# Patient Record
Sex: FEMALE | Race: WHITE | Employment: UNEMPLOYED | URBAN - METROPOLITAN AREA
[De-identification: names, ages, dates, MRNs, and addresses within clinical notes are randomized per-mention and may not be internally consistent; named-entity substitution may affect disease eponyms.]

---
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
## 2024-10-22 NOTE — PROGRESS NOTES
Weekly Treatment Plan Review Phase I Progress Note      ATTENDANCE    Dates: 9/22-9/28 MONDAY TUESDAY WEDNESDAY THURSDAY FRIDAY SATURDAY SUNDAY   Community 0.5 Hours 0.5 Hours 0.5 Hours 0.5 Hours 0.5 Hours       Chem Health                 School 2 Hours 1 Hours 2 Hours 1.5 Hours         Recreation 1 Hours 1 Hours 1 Hours 1 Hours 1 Hours       Specialty Groups* 1 Hours 1 Hours 1 Hours 1 Hours 1 Hours       1:1       0.5 Hours         Health Education                 Family Program                 Family Session   1 Hours             Dual Process Group 1.5 Hours 1.5 Hours 1.5 Hours 1.5 Hours 1.5 Hours       Absent                     *Specialty Groups include Assest Building, Mental Health Education, Spirituality, AA/NA Speakers, Life Skills, Stress Management, Social Skills and DBT Skills Group (Dual-Diagnosis programs only)  ________________________________________________________________________      Weekly Treatment Plan Review    Treatment Plan initiated on: 9/18/2017.    Dimension1: Acute Intoxication/Withdrawal Potential -   Date of Last Use Aug 2017  Any reports of withdrawal symptoms - No      Dimension 2: Biomedical Conditions & Complications -   Medical Concerns:  Client reportedly fell asleep during yoga relaxation on Friday and could not be awakened.  EMS was called and they were able to rouse client.  She was transported to [place] ED where she was medically cleared  Current Medications & Medication Changes: Prozac 40mg, Gabapentin 400mg, Hydroxyzine 10mg, Trazodone 100mg      Dimension 3: Emotional/Behavioral Conditions & Complications -   Mental health diagnosis 296.32 (F33.1) Major Depressive Disorder, Recurrent Episode, Moderate, 300.02 (F41.1) Generalized Anxiety Disorder  312.9 (F991.9) Unspecified Disruptive Impulse Control and Conduct Disorder     Taking meds as prescribed? Yes  Date of last SIB:  Feb 2017  Date of  last SI:  Aug 2017  Date of last HI: None  Behavioral Targets:   "SI/SIB  Current  Assignments:  Mental Health Checklist    Narrative:  Client denies SI or SIB this week.  Her mood has been somewhat labile with her at times appearing positive and laughing with peers and other times more tearful and subdued.  Client has presented with significant irritability some of the days as well.  Client reporting this week that she has her \"depression under control\" and reports anxiety is better so feels treatment is no longer needed.  Dicussed behavioral targets in her family session that would need to be met as well to indicate readiness for discharge.      Dimension 4: Treatment Acceptance / Resistance -   Stage - 1  Commitment to tx process/Stage of change- Contemplation  CAROL assignments - Chemical use self-assessment  Behavior plan -  None  Responsibility contract - None  Peer restrictions - None    Narrative - Client has attended and participated in most groups and activities this week.  As noted earlier, she left early on Friday via EMS for evaluation at the ED.  She verbalizes wanting to be done with treatment and not feeling she needs this any longer.  She has expressed frustration about not advancing in stages.  Staff reviewed what was needed for her to move to stage 2 this week in her family session and again on Wed.  Client struggled with following staff direction on Monday and took food that did not belong to her from the community refrigerator.  Client struggled to take responsibility for this behaviorl      Dimension 5: Relapse / Continued Problem Potential -   Relapses this week - None  Urges to use - None  UA results - Negative    Narrative- Client denies use and denies urges to use.  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    Dimension 6: Recovery Environment -   Family Involvement -   Summarize attendance at family groups and family sessions - Mother and   attended family session on 9/26 - see progress note.  Family supportive of program/stages?  " Yes    Community support group attendance - None - info related to youth meetings as well as [facility] support groups given to mother in family session  Recreational activities - Client reports playing video games and watching TV for recreation.  Program school involvement - Client has attended school program every day except Friday.  She has struggled with engagement in school activities but this has been better this last week.    Narrative - Client and mother participated in a family session on Tuesday.  Mother reported after client went to ED on Friday she then went to [facility] as mother did not feel able to have her come home in part due to her own frustrations with client.  Mother reports that they had some positive discussions upon her return home and she felt encouraged by this.        Discharge Planning:  Target Discharge Date/Timeframe:  8-12 weeks   Med Mgmt Provider/Appt:  Monika Garcia   Ind therapy Provider/Appt:  Lawson Galeana   Family therapy Provider/Appt:  None   Phase II plan:  [facility]  II   School enrollment:  [employer] [facility] in [employer]   Other referrals:  Consider RTC back up       Dimension Scale Review     Prior ratings: Dim1 - 0 DIM2 - 0 DIM3 - 3 DIM4 - 3 DIM5 - 3 DIM6 -3   Current ratings: Dim1 - 0 DIM2 - 0 DIM3 - 3 DIM4 - 3 DIM5 - 3 DIM6 -3       If client is 18 or older, has vulnerable adult status change? N/A    Are Treatment Plan goals/objectives having the intended effect? Yes  *If no, list changes to treatment plan:    Are the current goals meeting client's needs? Yes  *If no, list the changes to treatment plan.    Client Input / Response: Met with client for 10 min and reviewed tx plan review.  Client forgot assignments she needed for stage 2 today - agreed to bring tomorrow and if complete she can move to stage 2.  Client appeared in good spirits today, much less irritable.      *Client received copy of changes: No and declined  *Client is aware of right  to access a treatment plan review: Yes       denies

## 2024-11-26 NOTE — PROGRESS NOTES
"D: Spoke to mother about the weekend. Mother reported that she and client \"broke some ground this weekend and had a heart to heart.\" Mother reports that client stayed one night [facility] and feels it was eye opening to client. She reports that client was \"great\" after she picked her up from [facility]. Mother believes that client is more invested in making this program work for her and wants to get at \"the root\" of the problem. Mother reports that client followed stage 1 expectations apart from smoking a cigarette outside, which mother allowed for. Mother reported she is allowing this because she is \"picking her battles.\" Mother agreeable to family session tomorrow to discuss continuing in the program and keeping residential as a back up plan.   " Detail Level: Zone Detail Level: Simple